# Patient Record
Sex: FEMALE
[De-identification: names, ages, dates, MRNs, and addresses within clinical notes are randomized per-mention and may not be internally consistent; named-entity substitution may affect disease eponyms.]

---

## 2024-06-25 PROBLEM — Z00.00 ENCOUNTER FOR PREVENTIVE HEALTH EXAMINATION: Status: ACTIVE | Noted: 2024-06-25

## 2024-06-27 ENCOUNTER — APPOINTMENT (OUTPATIENT)
Dept: GYNECOLOGIC ONCOLOGY | Facility: CLINIC | Age: 54
End: 2024-06-27

## 2024-07-11 ENCOUNTER — APPOINTMENT (OUTPATIENT)
Dept: GYNECOLOGIC ONCOLOGY | Facility: CLINIC | Age: 54
End: 2024-07-11

## 2024-07-11 LAB
ANION GAP SERPL CALC-SCNC: 9 MMOL/L
BUN SERPL-MCNC: 16 MG/DL
CALCIUM SERPL-MCNC: 8.7 MG/DL
CHLORIDE SERPL-SCNC: 104 MMOL/L
CO2 SERPL-SCNC: 24 MMOL/L
CREAT SERPL-MCNC: 0.6 MG/DL
EGFR: 107 ML/MIN/1.73M2
GLUCOSE SERPL-MCNC: 93 MG/DL
HCT VFR BLD CALC: 39 %
HGB BLD-MCNC: 12.8 G/DL
MCHC RBC-ENTMCNC: 31 PG
MCHC RBC-ENTMCNC: 32.8 G/DL
MCV RBC AUTO: 94.4 FL
PLATELET # BLD AUTO: 228 K/UL
PMV BLD AUTO: 0 /100 WBCS
POTASSIUM SERPL-SCNC: 4.4 MMOL/L
RBC # BLD: 4.13 M/UL
RBC # FLD: 13.2 %
SODIUM SERPL-SCNC: 137 MMOL/L

## 2024-07-11 PROCEDURE — 99459 PELVIC EXAMINATION: CPT

## 2024-07-11 PROCEDURE — 99452 NTRPROF PH1/NTRNET/EHR RFRL: CPT

## 2024-07-11 PROCEDURE — 99204 OFFICE O/P NEW MOD 45 MIN: CPT

## 2024-07-12 VITALS
HEIGHT: 64 IN | BODY MASS INDEX: 27.31 KG/M2 | HEART RATE: 67 BPM | SYSTOLIC BLOOD PRESSURE: 124 MMHG | DIASTOLIC BLOOD PRESSURE: 70 MMHG | WEIGHT: 160 LBS

## 2024-08-01 ENCOUNTER — RESULT REVIEW (OUTPATIENT)
Age: 54
End: 2024-08-01

## 2024-08-02 ENCOUNTER — APPOINTMENT (OUTPATIENT)
Dept: GYNECOLOGIC ONCOLOGY | Facility: HOSPITAL | Age: 54
End: 2024-08-02

## 2024-08-02 ENCOUNTER — RESULT REVIEW (OUTPATIENT)
Age: 54
End: 2024-08-02

## 2024-08-02 ENCOUNTER — TRANSCRIPTION ENCOUNTER (OUTPATIENT)
Age: 54
End: 2024-08-02

## 2024-08-02 PROCEDURE — 58661 LAPAROSCOPY REMOVE ADNEXA: CPT | Mod: 50

## 2024-08-15 ENCOUNTER — APPOINTMENT (OUTPATIENT)
Dept: GYNECOLOGIC ONCOLOGY | Facility: CLINIC | Age: 54
End: 2024-08-15
Payer: COMMERCIAL

## 2024-08-15 PROCEDURE — 99024 POSTOP FOLLOW-UP VISIT: CPT

## 2024-08-15 NOTE — ASSESSMENT
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  I discussed with patient the result explained to the patient she may continue to follow-up with her OB/GYN and her primary medical physician for health maintenance.

## 2024-08-15 NOTE — REASON FOR VISIT
[Post Op] : post op visit [de-identified] : 8/2/24 [de-identified] : 54-year-old female, status post robotic BSO for pelvic mass, patient had a history of prior hysterectomy.  Patient is here for result discussion postop examination.  Patient reports no complaints.  Surgical incision scars are closed, dry and intact, no erythema or induration noted. [Family Member] : family member